# Patient Record
Sex: FEMALE | Race: WHITE | NOT HISPANIC OR LATINO | Employment: FULL TIME | URBAN - METROPOLITAN AREA
[De-identification: names, ages, dates, MRNs, and addresses within clinical notes are randomized per-mention and may not be internally consistent; named-entity substitution may affect disease eponyms.]

---

## 2017-03-03 ENCOUNTER — TRANSCRIBE ORDERS (OUTPATIENT)
Dept: ADMINISTRATIVE | Facility: HOSPITAL | Age: 47
End: 2017-03-03

## 2017-03-03 DIAGNOSIS — R51.9 ACUTE NONINTRACTABLE HEADACHE, UNSPECIFIED HEADACHE TYPE: Primary | ICD-10-CM

## 2017-03-28 ENCOUNTER — LAB (OUTPATIENT)
Dept: LAB | Facility: HOSPITAL | Age: 47
End: 2017-03-28

## 2017-03-28 ENCOUNTER — TRANSCRIBE ORDERS (OUTPATIENT)
Dept: LAB | Facility: HOSPITAL | Age: 47
End: 2017-03-28

## 2017-03-28 DIAGNOSIS — Z79.899 POLYPHARMACY: ICD-10-CM

## 2017-03-28 DIAGNOSIS — Z79.899 POLYPHARMACY: Primary | ICD-10-CM

## 2017-03-28 LAB — POTASSIUM BLD-SCNC: 4.3 MMOL/L (ref 3.5–5.5)

## 2017-03-28 PROCEDURE — 84132 ASSAY OF SERUM POTASSIUM: CPT | Performed by: DERMATOLOGY

## 2017-03-28 PROCEDURE — 36415 COLL VENOUS BLD VENIPUNCTURE: CPT

## 2018-06-18 ENCOUNTER — TRANSCRIBE ORDERS (OUTPATIENT)
Dept: ADMINISTRATIVE | Facility: HOSPITAL | Age: 48
End: 2018-06-18

## 2018-06-18 ENCOUNTER — HOSPITAL ENCOUNTER (OUTPATIENT)
Dept: CT IMAGING | Facility: HOSPITAL | Age: 48
Discharge: HOME | End: 2018-06-18
Admitting: INTERNAL MEDICINE

## 2018-06-18 DIAGNOSIS — R51.9 FACIAL PAIN, ACUTE: ICD-10-CM

## 2018-06-18 DIAGNOSIS — R51.9 FACIAL PAIN, ACUTE: Primary | ICD-10-CM

## 2018-06-18 PROCEDURE — 70486 CT MAXILLOFACIAL W/O DYE: CPT

## 2020-10-27 ENCOUNTER — OFFICE VISIT (OUTPATIENT)
Dept: OBSTETRICS AND GYNECOLOGY | Facility: CLINIC | Age: 50
End: 2020-10-27

## 2020-10-27 VITALS
WEIGHT: 113 LBS | SYSTOLIC BLOOD PRESSURE: 120 MMHG | DIASTOLIC BLOOD PRESSURE: 60 MMHG | HEIGHT: 63 IN | BODY MASS INDEX: 20.02 KG/M2

## 2020-10-27 DIAGNOSIS — Z00.00 ANNUAL PHYSICAL EXAM: ICD-10-CM

## 2020-10-27 DIAGNOSIS — Z01.419 PAP TEST, AS PART OF ROUTINE GYNECOLOGICAL EXAMINATION: Primary | ICD-10-CM

## 2020-10-27 DIAGNOSIS — N95.1 MENOPAUSAL SYMPTOMS: ICD-10-CM

## 2020-10-27 LAB
DEVELOPER EXPIRATION DATE: NORMAL
DEVELOPER LOT NUMBER: NORMAL
EXPIRATION DATE: NORMAL
FECAL OCCULT BLOOD SCREEN, POC: NEGATIVE
Lab: NORMAL
NEGATIVE CONTROL: NEGATIVE
POSITIVE CONTROL: POSITIVE

## 2020-10-27 PROCEDURE — 99396 PREV VISIT EST AGE 40-64: CPT | Performed by: OBSTETRICS & GYNECOLOGY

## 2020-10-27 PROCEDURE — 82274 ASSAY TEST FOR BLOOD FECAL: CPT | Performed by: OBSTETRICS & GYNECOLOGY

## 2020-10-27 RX ORDER — BUTALBITAL, ACETAMINOPHEN AND CAFFEINE 50; 325; 40 MG/1; MG/1; MG/1
CAPSULE ORAL
COMMUNITY
Start: 2020-10-08

## 2020-10-27 RX ORDER — SPIRONOLACTONE 50 MG/1
TABLET, FILM COATED ORAL
COMMUNITY
Start: 2020-09-09

## 2020-10-29 ENCOUNTER — TRANSCRIBE ORDERS (OUTPATIENT)
Dept: OBSTETRICS AND GYNECOLOGY | Facility: CLINIC | Age: 50
End: 2020-10-29

## 2020-10-29 DIAGNOSIS — Z12.31 VISIT FOR SCREENING MAMMOGRAM: Primary | ICD-10-CM

## 2020-11-02 DIAGNOSIS — Z01.419 PAP TEST, AS PART OF ROUTINE GYNECOLOGICAL EXAMINATION: ICD-10-CM

## 2020-11-27 ENCOUNTER — RESULTS ENCOUNTER (OUTPATIENT)
Dept: OBSTETRICS AND GYNECOLOGY | Facility: CLINIC | Age: 50
End: 2020-11-27

## 2020-11-27 DIAGNOSIS — Z01.419 PAP TEST, AS PART OF ROUTINE GYNECOLOGICAL EXAMINATION: ICD-10-CM

## 2020-12-29 NOTE — TELEPHONE ENCOUNTER
Pharmacy called wanting this pt prescription refilled. It is a vaginal cream, it is not on file in epic, it is in [facility].

## 2020-12-30 RX ORDER — ESTRADIOL 0.1 MG/G
CREAM VAGINAL
Qty: 1 EACH | Refills: 12 | Status: SHIPPED | OUTPATIENT
Start: 2020-12-30 | End: 2022-02-22 | Stop reason: SDUPTHER

## 2021-02-25 ENCOUNTER — HOSPITAL ENCOUNTER (OUTPATIENT)
Dept: MAMMOGRAPHY | Facility: HOSPITAL | Age: 51
Discharge: HOME | End: 2021-02-25
Admitting: OBSTETRICS & GYNECOLOGY

## 2021-02-25 DIAGNOSIS — Z12.31 VISIT FOR SCREENING MAMMOGRAM: ICD-10-CM

## 2021-02-25 PROCEDURE — 77067 SCR MAMMO BI INCL CAD: CPT | Performed by: RADIOLOGY

## 2021-02-25 PROCEDURE — 77063 BREAST TOMOSYNTHESIS BI: CPT

## 2021-02-25 PROCEDURE — 77067 SCR MAMMO BI INCL CAD: CPT

## 2021-02-25 PROCEDURE — 77063 BREAST TOMOSYNTHESIS BI: CPT | Performed by: RADIOLOGY

## 2021-03-22 ENCOUNTER — TRANSCRIBE ORDERS (OUTPATIENT)
Dept: ADMINISTRATIVE | Facility: HOSPITAL | Age: 51
End: 2021-03-22

## 2021-03-22 DIAGNOSIS — M54.6 THORACIC SPINE PAIN: ICD-10-CM

## 2021-03-22 DIAGNOSIS — M54.2 NECK PAIN: Primary | ICD-10-CM

## 2021-04-07 ENCOUNTER — HOSPITAL ENCOUNTER (OUTPATIENT)
Dept: MRI IMAGING | Facility: HOSPITAL | Age: 51
Discharge: HOME | End: 2021-04-07
Admitting: INTERNAL MEDICINE

## 2021-04-07 ENCOUNTER — APPOINTMENT (OUTPATIENT)
Dept: MRI IMAGING | Facility: HOSPITAL | Age: 51
End: 2021-04-07

## 2021-04-07 DIAGNOSIS — M54.2 NECK PAIN: ICD-10-CM

## 2021-04-07 PROCEDURE — 72141 MRI NECK SPINE W/O DYE: CPT

## 2021-07-04 RX ORDER — FLUCONAZOLE 150 MG/1
150 TABLET ORAL DAILY
Qty: 2 TABLET | Refills: 1 | Status: SHIPPED | OUTPATIENT
Start: 2021-07-04 | End: 2021-07-06

## 2022-02-21 ENCOUNTER — TELEPHONE (OUTPATIENT)
Dept: OBSTETRICS AND GYNECOLOGY | Facility: CLINIC | Age: 52
End: 2022-02-21

## 2022-02-21 NOTE — TELEPHONE ENCOUNTER
I called [facility]. The pt refilled her estradiol cream on 12/14/2021 given 2 tubes #30 grams each and she needs a refill today. Her apt is with Martha tomorrow. I approved 1 tube. The directions are 1 gram 1-3 times a week and she said she was told she could use it more often and it was not dangerous the do that. The pharmacy just wants the new rx to confirm the dose and directions.

## 2022-02-21 NOTE — TELEPHONE ENCOUNTER
Patient sent a request to pharmacy to refill vaginal cream. Pharmacy has questions in regards to dosages that are being used as well.

## 2022-02-22 ENCOUNTER — OFFICE VISIT (OUTPATIENT)
Dept: OBSTETRICS AND GYNECOLOGY | Facility: CLINIC | Age: 52
End: 2022-02-22

## 2022-02-22 VITALS — WEIGHT: 116.8 LBS | DIASTOLIC BLOOD PRESSURE: 72 MMHG | SYSTOLIC BLOOD PRESSURE: 100 MMHG | BODY MASS INDEX: 20.7 KG/M2

## 2022-02-22 DIAGNOSIS — N94.10 DYSPAREUNIA, FEMALE: ICD-10-CM

## 2022-02-22 DIAGNOSIS — Z12.11 SCREENING FOR COLON CANCER: ICD-10-CM

## 2022-02-22 DIAGNOSIS — N95.2 VAGINAL ATROPHY: ICD-10-CM

## 2022-02-22 DIAGNOSIS — N89.8 VAGINAL DRYNESS: ICD-10-CM

## 2022-02-22 DIAGNOSIS — Z01.419 WOMEN'S ANNUAL ROUTINE GYNECOLOGICAL EXAMINATION: Primary | ICD-10-CM

## 2022-02-22 DIAGNOSIS — Z12.31 BREAST CANCER SCREENING BY MAMMOGRAM: ICD-10-CM

## 2022-02-22 DIAGNOSIS — N95.1 MENOPAUSAL STATE: ICD-10-CM

## 2022-02-22 PROCEDURE — 99396 PREV VISIT EST AGE 40-64: CPT | Performed by: NURSE PRACTITIONER

## 2022-02-22 RX ORDER — ESTRADIOL 0.1 MG/G
CREAM VAGINAL
Qty: 1 EACH | Refills: 12 | Status: SHIPPED | OUTPATIENT
Start: 2022-02-22

## 2022-02-22 NOTE — PROGRESS NOTES
GYN Annual Exam     CC - Here for annual exam.     Subjective   HPI  Mary Cummings is a 51 y.o. female, , who presents for annual well woman exam.  She is postmenopausal.   Patient reports problems with: nothing. Partner Status: Marital Status: .  New Partners since last visit: no Desires STD Screening: no    Last mammogram: 2021    Last colonoscopy: Never- Patient has requested Cologuard last year but did not receive kit.     Last DEXA:    Last Pap : 10/27/2020- Pap w/ refelx- negative  Last Completed Pap Smear          Ordered - PAP SMEAR (Every 3 Years) Ordered on 2020  Pap IG, Rfx HPV ASCU    2019  Done - negative              History of abnormal Pap smear: no    Additional OB/GYN History   Family history of uterine, colon, breast, or ovarian cancer: no  Performs monthly Self-Breast Exam: yes - admits  Parental Hip Fracture: denies  Exercises Regularly: yes - on occasion  Feelings of Anxiety or Depression: yes - intermittent    Tobacco Usage?: No   OB History        4    Para   4    Term   3       1    AB        Living   3       SAB        IAB        Ectopic        Molar        Multiple        Live Births   3                Health Maintenance   Topic Date Due   • TDAP/TD VACCINES (1 - Tdap) Never done   • ZOSTER VACCINE (1 of 2) Never done   • HEPATITIS C SCREENING  Never done   • INFLUENZA VACCINE  Never done   • COLORECTAL CANCER SCREENING  10/27/2021   • ANNUAL PHYSICAL  10/28/2021   • Annual Gynecologic Pelvic and Breast Exam  10/28/2021   • COVID-19 Vaccine (3 - Booster for Pfizer series) 2022   • MAMMOGRAM  2023   • PAP SMEAR  2023   • Pneumococcal Vaccine 0-64  Aged Out       The additional following portions of the patient's history were reviewed and updated as appropriate: allergies, current medications, past family history, past medical history, past social history, past surgical history and problem list.    Review of  Systems   Constitutional: Negative.    HENT: Negative.    Eyes: Negative.    Respiratory: Negative.    Cardiovascular: Negative.    Gastrointestinal: Negative.    Endocrine: Negative.    Genitourinary: Negative.    Musculoskeletal: Negative.    Skin: Negative.    Allergic/Immunologic: Negative.    Neurological: Negative.    Hematological: Negative.    Psychiatric/Behavioral: Negative.        I have reviewed and agree with the HPI, ROS, and historical information as entered above. Martha Alvarado Sukhdev, APRN    Objective   /72   Wt 53 kg (116 lb 12.8 oz)   LMP  (LMP Unknown)   Breastfeeding No   BMI 20.70 kg/m²     Physical Exam  Vitals and nursing note reviewed. Exam conducted with a chaperone present.   Constitutional:       General: She is not in acute distress.     Appearance: Normal appearance. She is well-developed. She is not ill-appearing.   HENT:      Head: Normocephalic and atraumatic.   Neck:      Thyroid: No thyroid mass or thyromegaly.   Pulmonary:      Effort: Pulmonary effort is normal. No retractions.   Chest:      Chest wall: No mass.   Breasts:      Right: Normal. No mass, nipple discharge, skin change or tenderness.      Left: Normal. No mass, nipple discharge, skin change or tenderness.       Abdominal:      Palpations: Abdomen is soft. Abdomen is not rigid. There is no mass.      Tenderness: There is no abdominal tenderness. There is no guarding.      Hernia: No hernia is present. There is no hernia in the left inguinal area.   Genitourinary:     General: Normal vulva.      Labia:         Right: No rash, tenderness or lesion.         Left: No rash, tenderness or lesion.       Vagina: Normal. No vaginal discharge or lesions.      Cervix: Normal.      Uterus: Normal. Not enlarged, not fixed and not tender.       Adnexa: Right adnexa normal and left adnexa normal.        Right: No mass or tenderness.          Left: No mass or tenderness.        Rectum: No external hemorrhoid.      Comments:  Vaginal atrophy  Musculoskeletal:      Cervical back: Normal range of motion. No muscular tenderness.   Neurological:      Mental Status: She is alert and oriented to person, place, and time.   Psychiatric:         Behavior: Behavior normal.           Assessment/Plan     Encounter Diagnoses   Name Primary?   • Women's annual routine gynecological examination Yes   • Screening for colon cancer    • Breast cancer screening by mammogram    • Menopausal state    • Dyspareunia, female    • Vaginal atrophy    • Vaginal dryness        Recommended use of Vitamin D replacement and getting adequate calcium in her diet. (1500mg)  Reviewed monthly self breast exams.  Instructed to call with lumps, pain, or breast discharge.    Continue yearly mammography  Reviewed HPV guidelines.  Reviewed exercise as a preventative health measures.   Colonoscopy recommended now at 43.  Offered referral.  Reviewed risks/benefits of ERT including the lack of evidence estrogen alone increases the risk of breast cancer or stroke and decreases risk of hip fracture, colon cancer and all cause mortality.  However there is concern slight increase risk of pulmonary embolism.   Patient strongly desires to stay on or start HRT.  She understands she will use the lowest dose that adequately controls her symptoms.    Martha Santizo, APRN   02/22/2022

## 2022-02-25 DIAGNOSIS — Z01.419 WOMEN'S ANNUAL ROUTINE GYNECOLOGICAL EXAMINATION: ICD-10-CM

## 2022-06-22 ENCOUNTER — HOSPITAL ENCOUNTER (OUTPATIENT)
Dept: MAMMOGRAPHY | Facility: HOSPITAL | Age: 52
Discharge: HOME | End: 2022-06-22
Admitting: NURSE PRACTITIONER

## 2022-06-22 DIAGNOSIS — Z12.31 BREAST CANCER SCREENING BY MAMMOGRAM: ICD-10-CM

## 2022-06-22 PROCEDURE — 77067 SCR MAMMO BI INCL CAD: CPT | Performed by: RADIOLOGY

## 2022-06-22 PROCEDURE — 77063 BREAST TOMOSYNTHESIS BI: CPT | Performed by: RADIOLOGY

## 2022-06-22 PROCEDURE — 77063 BREAST TOMOSYNTHESIS BI: CPT

## 2022-06-22 PROCEDURE — 77067 SCR MAMMO BI INCL CAD: CPT

## 2023-02-01 ENCOUNTER — TELEPHONE (OUTPATIENT)
Dept: OBSTETRICS AND GYNECOLOGY | Facility: CLINIC | Age: 53
End: 2023-02-01
Payer: COMMERCIAL

## 2023-02-01 RX ORDER — FLUCONAZOLE 150 MG/1
TABLET ORAL
Qty: 3 TABLET | Refills: 1 | Status: SHIPPED | OUTPATIENT
Start: 2023-02-01

## 2023-02-01 NOTE — TELEPHONE ENCOUNTER
Pt stated she has a yeast infection, stated she had tried otc meds and nothing is helping asked if she could get some diflucan

## 2023-02-01 NOTE — TELEPHONE ENCOUNTER
Last annual: 02/21/2022 with [facility]    S/w patient- patient reports vaginal itching, burning, and thick/white discharge. Patient denies vaginal odor or new sexual partners. Patient has tried Monistat OTC without relief in s/s. Patient requesting Diflucan. Informed patient that I will discuss this with a provider and CB with plan of care. She v/u.

## 2023-04-10 ENCOUNTER — TELEPHONE (OUTPATIENT)
Dept: OBSTETRICS AND GYNECOLOGY | Facility: CLINIC | Age: 53
End: 2023-04-10
Payer: COMMERCIAL

## 2023-04-10 DIAGNOSIS — N95.1 MENOPAUSAL SYMPTOMS: Primary | ICD-10-CM

## 2023-04-10 RX ORDER — ESTRADIOL 0.1 MG/G
CREAM VAGINAL
Qty: 1 EACH | Refills: 0 | Status: SHIPPED | OUTPATIENT
Start: 2023-04-10

## 2023-04-10 NOTE — TELEPHONE ENCOUNTER
Refill sent. Pt needs annual appt with us for further refills.      PHARMACY STATES PATIENT IS REQUESTING A REFILL OF estradiol (ESTRACE VAGINAL) 0.1 MG/GM vaginal cream    [facility] [address] [facility] [phone]  - [phone]   [phone]

## 2023-05-08 ENCOUNTER — TELEPHONE (OUTPATIENT)
Dept: OBSTETRICS AND GYNECOLOGY | Facility: CLINIC | Age: 53
End: 2023-05-08

## 2023-05-08 NOTE — TELEPHONE ENCOUNTER
Caller: THAIS TOM     Relationship to patient: SELF    Best call back number: [phone]    Patient is needing: PT STARTED HRT 6-8 MONTHS AGO AND STARTED BLEEDING OFF AND ON SINCE. ANOTHER PROVIDER SHE SEES HAS RECOMMENDED SHE  SEE A GYNECOLOGIST WITH U/S BECAUSE OF A HX OF OVARIAN CYSTS.    PATIENT  WANTING TO KNOW IF SHE COULD JUST HAVE AN U/S FOR NOW AND THEN SCHEDULE HER ANNUAL FOR AFTER June.           PATIENT WOULD PREFER TO SEE [facility] IF POSSIBLE. BUT WILL SEE SOMEONE ELSE IF THEY'RE AVAILABLE SOONER. COULD NOT FIND GYN FOLLOW UP OR ANNUAL APPT WITH U/S BEFORE THE FIRST WEEK OF June, WHEN PATIENT IS GOING ON VACATION.     PLEASE ADVISE PATIENT AT NUMBER LISTED ABOVE IF SOONER APPT IS AVAILABLE. SHE SAID SHE WOULD DO TWO SEPARATE IF SHE COULD JUST GET THE US FIRST.    FINE WITH LVM IF UNABLE TO ANSWER.

## 2023-05-12 DIAGNOSIS — N95.0 PMB (POSTMENOPAUSAL BLEEDING): Primary | ICD-10-CM

## 2023-05-19 ENCOUNTER — OFFICE VISIT (OUTPATIENT)
Dept: OBSTETRICS AND GYNECOLOGY | Facility: CLINIC | Age: 53
End: 2023-05-19
Payer: COMMERCIAL

## 2023-05-19 VITALS
WEIGHT: 111.4 LBS | HEIGHT: 63 IN | BODY MASS INDEX: 19.74 KG/M2 | SYSTOLIC BLOOD PRESSURE: 100 MMHG | DIASTOLIC BLOOD PRESSURE: 74 MMHG

## 2023-05-19 DIAGNOSIS — N95.0 POSTMENOPAUSAL BLEEDING: Primary | ICD-10-CM

## 2023-05-19 DIAGNOSIS — Z79.890 HORMONE REPLACEMENT THERAPY (HRT): ICD-10-CM

## 2023-05-19 NOTE — PROGRESS NOTES
Chief Complaint   Patient presents with   • Gynecologic Exam       Subjective   HPI  Mary Cummings is a 52 y.o. female, .   She had bioidentical pellets containing estrogen and testosterone inserted 6-8 months ago [place].  She also began  Prometrium qhs.  She feels much better since using these.    She has had intermittent spotting since starting them.  Her provider has been adjusting doses---- Prometrium 100 to 200 mg--- recently.  The spotting is scant and brown.  She denies pain, itching, odor, fever, dysuria.  Her provider suggested she have an US to rule out ov cyst.    She has also been using vaginal estrogen with tremendous improvement in dryness, painful intercourse, and itching/burning.      Additional OB/GYN History     Last Pap : 2022 negative, HPV negative  Last Completed Pap Smear          PAP SMEAR (Every 3 Years) Next due on 2022  SCANNED - PAP SMEAR    2020  Pap IG, Rfx HPV ASCU    2019  Done - negative                Last mammogram: 2022 negative  Last Completed Mammogram          MAMMOGRAM (Every 2 Years) Next due on 2022  Mammo Screening Digital Tomosynthesis Bilateral With CAD    2021  Mammo Screening Digital Tomosynthesis Bilateral With CAD                Tobacco Usage?: No   OB History        4    Para   4    Term   3       1    AB        Living   3       SAB        IAB        Ectopic        Molar        Multiple        Live Births   3                  Current Outpatient Medications:   •  Butalbital-APAP-Caffeine -40 MG per capsule, , Disp: , Rfl:   •  estradiol (ESTRACE VAGINAL) 0.1 MG/GM vaginal cream, Insert 1 gm intravaginally 1-3 times each week, Disp: 1 each, Rfl: 0  •  fluconazole (Diflucan) 150 MG tablet, Take 1 tablet today and repeat in 3 days for a total of 3 doses., Disp: 3 tablet, Rfl: 1  •  spironolactone (ALDACTONE) 50 MG tablet, , Disp: , Rfl:   "    Past Medical History:   Diagnosis Date   • Bicornuate uterus    • Headache    • Screening breast examination     ADMITS   • Yeast infection     RECURRENT        Past Surgical History:   Procedure Laterality Date   • AUGMENTATION MAMMAPLASTY     • BREAST AUGMENTATION      BILATERAL   • HERNIA REPAIR     • OTHER SURGICAL HISTORY      VENACAVE LIGATION   • TUBAL ABDOMINAL LIGATION         The additional following portions of the patient's history were reviewed and updated as appropriate: allergies, current medications, past family history, past medical history, past social history, past surgical history and problem list.    Review of Systems    I have reviewed and agree with the HPI, ROS, and historical information as entered above. Martha Santizo, APRN    Objective   /74   Ht 160 cm (62.99\")   Wt 50.5 kg (111 lb 6.4 oz)   LMP  (LMP Unknown)   BMI 19.74 kg/m²     Physical Exam  Vitals and nursing note reviewed. Exam conducted with a chaperone present.   Constitutional:       General: She is not in acute distress.     Appearance: Normal appearance. She is not ill-appearing.   Pulmonary:      Effort: Pulmonary effort is normal. No respiratory distress.   Genitourinary:     General: Normal vulva.      Vagina: Normal.      Cervix: Normal.      Uterus: Normal.       Adnexa: Right adnexa normal and left adnexa normal.   Skin:     General: Skin is warm and dry.   Neurological:      Mental Status: She is alert and oriented to person, place, and time.   Psychiatric:         Mood and Affect: Mood normal.         Behavior: Behavior normal.         Assessment & Plan     Assessment     Problem List Items Addressed This Visit    None  Visit Diagnoses     Postmenopausal bleeding    -  Primary    Hormone replacement therapy (HRT)              Plan   1.  D/w pt pelvic exam wnl.  US shows EMS of 3mm, normal ovaries.    Return in about 1 year (around 5/19/2024), or if symptoms worsen or fail to improve, for Annual physical. "   She is happy on these medications and wants to continue.  Recommend RTO if bleeding continues for further eval, possible EMB.    Pap not due        Martha Santizo, APRN  05/19/2023

## 2023-11-21 NOTE — PROGRESS NOTES
GYN Annual Exam     CC - Here for annual exam.     Subjective   HPI  Mary Cummings is a 50 y.o. female, , who presents for annual well woman exam.  She is postmenopausal. Her last LMP was No LMP recorded. Patient is postmenopausal..  Periods are pt postmenopausal, lasting 0 days.  Dysmenorrhea:none.  Patient reports problems with: vaginal dryness.  Partner Status: Marital Status: .    Desires STD Screening: YES/NO/Other: no.    Patient asking about cologuard. [facility]s [facility]     Additional OB/GYN History   Current contraception: postmenopausal    Last Pap :   Last Completed Pap Smear       Status Date      PAP SMEAR Done 2019 negative        History of abnormal Pap smear: no  Family history of uterine, colon, breast, or ovarian cancer: no  Last mammogram:   Last Completed Mammogram       Status Date      MAMMOGRAM No completions recorded        Last colonoscopy:   Last Completed Colonoscopy       Status Date      COLONOSCOPY No completions recorded        Last DEXA:    Exercises Regularly: yes  Feelings of Anxiety or Depression: no    Tobacco Usage?:     Health Maintenance   Topic Date Due   • Annual Gynecologic Pelvic and Breast Exam  1968   • MAMMOGRAM  1968   • COLONOSCOPY  1968   • ANNUAL PHYSICAL  1971   • TDAP/TD VACCINES (1 - Tdap) 1987   • ZOSTER VACCINE (1 of 2) 2018   • INFLUENZA VACCINE  2020   • HEPATITIS C SCREENING  10/02/2020   • PAP SMEAR  2022   • Pneumococcal Vaccine 0-64  Aged Out       Current Outpatient Medications   Medication Sig Dispense Refill   • Butalbital-APAP-Caffeine -40 MG per capsule      • spironolactone (ALDACTONE) 50 MG tablet        No current facility-administered medications for this visit.        The additional following portions of the patient's history were reviewed and updated as appropriate: allergies, current medications, past family  "history, past medical history, past social history, past surgical history and problem list.    Review of Systems   Constitutional: Negative.    HENT: Negative.    Respiratory: Negative.    Cardiovascular: Negative.    Gastrointestinal: Negative.    Genitourinary:        Vaginal dryness   Musculoskeletal: Negative.    Skin: Negative.    Allergic/Immunologic: Negative.    Neurological: Negative.    Hematological: Negative.    Psychiatric/Behavioral: Negative.        I have reviewed and agree with the HPI, ROS, and historical information as entered above. Kelby Roman MD    Objective   /60   Ht 160 cm (63\")   Wt 51.3 kg (113 lb)   BMI 20.02 kg/m²     PE  Breast: Without masses, no skin changes or retractions, nontender  Axilla normal, no lymphadenopathy  Heart-rate no murmurs rubs or gallops  Lungs-clear to auscultation, normal breath sounds bilaterally  Abd-soft no hepatosplenomegaly, nontender, no guarding or rebound, no masses  Pelvic exam-  External genitalia normal vulva and introitus, formation discharge or bleeding  Vagina vaginal mucosa normal, no inflammation lesions or bleeding  Bladder normal position, nontender, no cystocele  Urethra and urethral meatus normal  Cervix no lesions no bleeding no discharge no inflammation  Bimanual nontender adnexa clear, sign of uterine or ovarian enlargement, no detectable pelvic masses  RV negative Hemoccult negative, no anal lesions, no hemorrhoids    Assessment/Plan     Assessment     Problem List Items Addressed This Visit        Genitourinary    Menopausal symptoms       Other    Annual physical exam      Other Visit Diagnoses     Pap test, as part of routine gynecological examination    -  Primary    Relevant Orders    Pap IG, Rfx HPV ASCU    Cologuard - Stool, Per Rectum    POC Occult Blood Stool (Completed)          1. GYN annual well woman exam.   2. Doing well patient request Cologuard information, she is very anxious about the possibility of a " colonoscopy    Plan     1. Reviewed HPV guidelines   2. Lifestyles reviewed including diet and exercise  3. Cologuard order sent        Kelby Roman MD  10/27/2020     Doxycycline Pregnancy And Lactation Text: This medication is Pregnancy Category D and not consider safe during pregnancy. It is also excreted in breast milk but is considered safe for shorter treatment courses.

## 2025-01-28 ENCOUNTER — TRANSCRIBE ORDERS (OUTPATIENT)
Dept: ADMINISTRATIVE | Facility: HOSPITAL | Age: 55
End: 2025-01-28
Payer: COMMERCIAL

## 2025-01-28 DIAGNOSIS — Z12.31 VISIT FOR SCREENING MAMMOGRAM: Primary | ICD-10-CM

## 2025-02-14 ENCOUNTER — HOSPITAL ENCOUNTER (OUTPATIENT)
Dept: MAMMOGRAPHY | Facility: HOSPITAL | Age: 55
Discharge: HOME | End: 2025-02-14
Admitting: INTERNAL MEDICINE
Payer: COMMERCIAL

## 2025-02-14 DIAGNOSIS — Z12.31 VISIT FOR SCREENING MAMMOGRAM: ICD-10-CM

## 2025-02-14 LAB
NCCN CRITERIA FLAG: NORMAL
TYRER CUZICK SCORE: 6

## 2025-02-14 PROCEDURE — 77063 BREAST TOMOSYNTHESIS BI: CPT

## 2025-02-14 PROCEDURE — 77067 SCR MAMMO BI INCL CAD: CPT
